# Patient Record
(demographics unavailable — no encounter records)

---

## 2025-01-31 NOTE — HISTORY OF PRESENT ILLNESS
[FreeTextEntry1] : NOAH BENNETT  is being evaluated at the request of Dr. Mason for an opinion re: colon cancer screening.  Denies nausea, vomiting, fever, chills, diarrhea, constipation, melena, hematemesis, BRBPR, reflux

## 2025-01-31 NOTE — CONSULT LETTER
[Dear  ___] : Dear  [unfilled], [Consult Letter:] : I had the pleasure of evaluating your patient, [unfilled]. [Please see my note below.] : Please see my note below. [Consult Closing:] : Thank you very much for allowing me to participate in the care of this patient.  If you have any questions, please do not hesitate to contact me. [Sincerely,] : Sincerely, [FreeTextEntry3] : Daniel Pressley MD tel: [phone] fax: [phone]

## 2025-01-31 NOTE — ASSESSMENT
[FreeTextEntry1] : Colon cancer screening: Colonoscopy scheduled  Pertinent available records reviewed Risks of the procedure including but not limited to bleeding / perforation / infection / anesthesia complication / missed polyp or lesion explained to the  patient . The patient expressed understanding and a desire to proceed with the procedure.  Risk of not doing procedure includes but is not limited to missed or delayed diagnosis of gastrointestinal pathology. A consultation note was provided to the referring provider

## 2025-01-31 NOTE — PHYSICAL EXAM
[Alert] : alert [Sclera] : the sclera and conjunctiva were normal [No Respiratory Distress] : no respiratory distress [None] : no edema [Abdomen Soft] : soft [Abnormal Walk] : normal gait [Cranial Nerves Intact] : cranial nerves 2-12 were intact [Oriented To Time, Place, And Person] : oriented to person, place, and time